# Patient Record
Sex: MALE | Race: AMERICAN INDIAN OR ALASKA NATIVE | Employment: FULL TIME | URBAN - METROPOLITAN AREA
[De-identification: names, ages, dates, MRNs, and addresses within clinical notes are randomized per-mention and may not be internally consistent; named-entity substitution may affect disease eponyms.]

---

## 2018-10-05 ENCOUNTER — HOSPITAL ENCOUNTER (OUTPATIENT)
Dept: CT IMAGING | Age: 23
Discharge: HOME | End: 2018-10-05
Attending: UROLOGY
Payer: COMMERCIAL

## 2018-10-05 DIAGNOSIS — R10.31 INGUINAL PAIN, RIGHT: ICD-10-CM

## 2018-10-05 PROCEDURE — 74176 CT ABD & PELVIS W/O CONTRAST: CPT

## 2018-10-10 NOTE — PROGRESS NOTES
Please notify pt that CT scan showed no concerning findings -- no inguinal hernia and no findings to explain his right sided pain. May be related to muscle strain or nerve irritation. He can f/u PRN (and then f/u w/ PCP for further evaluation of his pain) or he can keep his 10/25/18 appt w/ me if he wishes   Thanks.

## 2019-05-01 ENCOUNTER — HOSPITAL ENCOUNTER (OUTPATIENT)
Dept: PHYSICAL THERAPY | Age: 24
Discharge: HOME | End: 2019-05-01
Payer: COMMERCIAL

## 2019-05-01 PROCEDURE — 97112 NEUROMUSCULAR REEDUCATION: CPT

## 2019-05-01 PROCEDURE — 97162 PT EVAL MOD COMPLEX 30 MIN: CPT

## 2019-05-01 PROCEDURE — 97530 THERAPEUTIC ACTIVITIES: CPT

## 2019-05-01 NOTE — PROGRESS NOTES
PT DAILY TREATMENT NOTE/LUMBAR EVAL 10-18    Patient Name: Marvel Patch  Date:2019  : 1995  [x]  Patient  Verified  Payor: Briana Estimable / Plan: [facility] HMO / Product Type: HMO /    In time:337  Out time:425  Total Treatment Time (min): 48  Visit #: 1 of 12    Medicare/BCBS Only   Total Timed Codes (min):  28 1:1 Treatment Time:  28     Treatment Area: Back pain [M54.9]  SUBJECTIVE  Pain Level (0-10 scale): 6-7/10  [x]constant []intermittent []improving []worsening []no change since onset  10/10 worst pain    Any medication changes, allergies to medications, adverse drug reactions, diagnosis change, or new procedure performed?: [x] No    [] Yes (see summary sheet for update)  Subjective functional status/changes:      Back pain since high school, fell off high deck   Most recent flare up began 6 months ago, felt intense back pain when lifting at work    Works on scrap yard , 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, on leave till    Hurts standing and walking, begins in left LE and then travels down R LE  Had xray 2 days ago does not have results   Lives with fiance  25-30 minutes without pain standing tolerance   Pt Goals: \" eliminate pain and gain strength and worry less about re-injury \"  Goes to the gym, but recently stopped due to back pain      OBJECTIVE/EXAMINATION      20 min [x]Eval                  []Re-Eval       12 min Therapeutic Activity:  []  See flow sheet : educated pt on findings, repeated movement testing with flexion bias using spinal model, core anatomy, use of tennis ball for self tissue release of lumbar paraspinals, use of modalities moist heat and stretching   Rationale: increase ROM and increase strength  to improve the patients ability to complete ADL's and work tasks with ease.       16 min Neuromuscular Re-education:  []  See flow sheet : diaphragmatic breathing, TA iso, TA marching, TA marching with GTB @ B UE, TA SLR, DKTC on SB, LTR on SB   Rationale: increase ROM, increase strength, improve coordination and increase proprioception  to improve the patients ability to activate TA to improve core stability and reduce low back pain with ADL's. With   [] TE   [] TA   [] neuro   [] other: Patient Education: [x] Review HEP    [] Progressed/Changed HEP based on:   [] positioning   [] body mechanics   [] transfers   [] heat/ice application    [] other:      Other Objective/Functional Measures:     Physical Therapy Evaluation - Lumbar Spine ([facility])    SUBJECTIVE  Chief Complaint: low back pain with intermittent radicular symptoms in B LE's    Mechanism of injury: chronic back pain exacerbated by heavy lifting     Symptoms:  Aggravated by:   [] Bending [] Sitting [x] Standing [x] Walking   [] Moving [] Cough [] Sneeze [] Valsalva   [] AM  [] PM  Lying:  [] sup   [] pro   [] sidelying   [] Other:     Eased by:    [] Bending [x] Sitting [] Standing [] Walking   [] Moving [] AM  [] PM  Lying: [x] sup  [] pro  [] sidelying   [] Other:     General Health:  Red Flags Indicated? [] Yes    [x] No  [] Yes [] No Recent weight change (If yes, due to dieting?  [] Yes  [] No)   [] Yes [] No Weakness in legs during walking  [] Yes [] No Unremitting pain at night  [] Yes [] No Abdominal pain or problems  [] Yes [] No Rectal bleeding  [] Yes [] No Feet more cold or painful in cold weather  [] Yes [] No Menstrual irregularities  [] Yes [] No Blood or pain with urination  [] Yes [] No Dysfunction of bowel or bladder  [] Yes [] No Recent illness within past 3 weeks (i.e, cold, flu)  [] Yes [] No Numbness/tingling in buttock/genitalia region    Past History/Treatments:     Diagnostic Tests: [] Lab work [] X-rays    [] CT [] MRI     [] Other:  Results:     Functional Status  Prior level of function:  Present functional limitations:  What position do you sleep in?:    OBJECTIVE  Posture:  Lateral Shift: [] R    [] L     [] +  [] -  Kyphosis: [] Increased [] Decreased   [] WNL  Lordosis:  [] Increased [] Decreased   [] WNL  Pelvic symmetry: [] WNL    [] Other:    Gait:  [x] Normal     [] Abnormal:    Active Movements: [] N/A   [] Too acute   [] Other: 23 cm from ground  ROM % AROM % PROM Comments:pain, area   Forward flexion 40-60 75%  pain   Extension 20-30 25%  pain   SB right 20-30 wnl     SB left 20-30 wnl     Rotation right 5-10 wnl     Rotation left 5-10 wnl       Repeated Movements   Effects on present pain: produces (MD), abolishes (A), increases (incr), decreases (decr), centralizes (C), peripheral (PH), no effect (NE)   Pre-Test Sx Flexion Repeated Flexion Extension Repeated Extension Repeated SBL Repeated SBR   Sitting   Decr, C       Standing    Incr, PH      Lying      N/A N/A   Comments:  Side Glide:  Sustained passive positioning test:    Neuro Screen [x] WNL  Myotome/Dermatome/Reflexes:  Comments:    Dural Mobility:  SLR Sitting: [] R    [] L    [] +    [] -  @ (degrees):           Supine: [] R    [] L    [] +    [] -  @ (degrees):   Slump Test: [x] R    [x] L    [x] +    [] -  @ (degrees):   Prone Knee Bend: [] R    [] L    [] +    [] -     Palpation   [] Min  [x] Mod  [x] Severe    Location: lumbar paraspinals, L2-L5 spinous processes  [] Min  [] Mod  [] Severe    Location:  [] Min  [] Mod  [] Severe    Location:    Strength   L(0-5) R (0-5) N/T   Hip Flexion (L1,2) 5/5 5/5 []   Knee Extension (L3,4) 5/5 5/5 []   Ankle Dorsiflexion (L4) 5/5 5/5 []   Great Toe Extension (L5) 5/5 5/5 []   Ankle Plantarflexion (S1)   []   Knee Flexion (S1,2)   []   Upper Abdominals   []   Lower Abdominals   []   Paraspinals   []   Back Rotators   []   Gluteus Ton   []   Other   []     Special Tests  Lumbar:  Lumb.  Compression: [] Pos  [] Neg               Lumbar Distraction:   [] Pos  [] Neg    Quadrant:  [] Pos  [] Neg   [] Flex  [] Ext    Sacroilliac:  Bobbi's: [] R    [] L    [] +    [] -     Compression: [] +    [] -     Gapping:  [] +    [] -     Thigh Thrust: [] R    [] L [] +    [] -     Leg Length: [] +    [] -   Position:    Crests:    ASIS:    PSIS:    Sacral Sulcus:    Mobility: Standing flex:     Sitting flex:     Supine to sit:     Prone knee bend:         Hip: Yessenia Farrah:  [] R    [] L    [] +    [] -     Scour:  [] R    [] L    [] +    [] -     Piriformis: [] R    [] L    [] +    [] -          Deficits: Arpit's: [] R    [] L    [] +    [] -     [place]: [] R    [] L    [] +    [] -     Hamstrings 90/90:    Gastrocsoleus (to neutral): Right: Left:       Global Muscular Weakness:  Abdominals:  Quadratus Lumborum:  Paraspinals: Other:    Other tests/comments:  Tight B hamstrings         Pain Level (0-10 scale) post treatment: 3/10    ASSESSMENT/Changes in Function: see POC. Patient will continue to benefit from skilled PT services to modify and progress therapeutic interventions, address functional mobility deficits, address ROM deficits, address strength deficits, analyze and address soft tissue restrictions, analyze and cue movement patterns, analyze and modify body mechanics/ergonomics and instruct in home and community integration to attain remaining goals.      [x]  See Plan of Care  []  See progress note/recertification  []  See Discharge Summary         Progress towards goals / Updated goals:  See POC    PLAN  []  Upgrade activities as tolerated     [x]  Continue plan of care  []  Update interventions per flow sheet       []  Discharge due to:_  []  Other:_      Breanne South, PT 5/1/2019  3:33 PM

## 2019-05-01 NOTE — PROGRESS NOTES
[facility]  [address]  [phone] fax    Plan of Care/ Statement of Necessity for Physical Therapy Services    Patient name: Phoenix Matos Start of Care: 2019   Referral source: Elliott Cobos DO : 1995    Medical Diagnosis: Back pain [M54.9]  Payor: Gerardo Dyer / Plan: [address] HMO / Product Type: HMO /  Onset Date:6 months ago    Treatment Diagnosis: low back pain   Prior Hospitalization: see medical history Provider#: [id number]   Medications: Verified on Patient summary List    Comorbidities: back pain   Prior Level of Function: goes to the gym, works at [address] [employer] yard as  ( 75# lifting)     The Plan of Care and following information is based on the information from the initial evaluation. Assessment/ key information: Patient is a 22year-old male with a history of chronic back pain since high school, who presents chief complaint of exacerbation of back pain that began 6 months ago after heavy lifting at work. Patient is currently on leave from work until  and has stopped going to the gym due to symptoms. Patient reports intermittent radicular pain down B LE's. He has limited standing and ambulatory tolerance due to increase pain; can stand for about 25 minutes comfortably. Patients l/s AROM is WNL, except flexion limited to 75% and extension 25% with pain. Pt presents with flexion bias; his radicular symptoms increase following repeated extension in standing and decrease/centralize following repeated flexion in sitting. He is TTP along lumbar paraspinals and L2-L5 spinous processes. Myotomes/dermatomes are within normal limits. Patient will benefit from skilled PT to improve core/hip strength and flexibility to increase standing/ambulatory tolerance and resume full duties at work.      Evaluation Complexity History MEDIUM  Complexity : 1-2 comorbidities / personal factors will impact the outcome/ POC ; Examination MEDIUM Complexity : 3 Standardized tests and measures addressing body structure, function, activity limitation and / or participation in recreation  ;Presentation MEDIUM Complexity : Evolving with changing characteristics  ; Clinical Decision Making MEDIUM Complexity : FOTO score of 26-74  Overall Complexity Rating: MEDIUM  Problem List: pain affecting function, decrease ROM, decrease strength, decrease ADL/ functional abilitiies, decrease activity tolerance and decrease flexibility/ joint mobility   Treatment Plan may include any combination of the following: Therapeutic exercise, Therapeutic activities, Neuromuscular re-education, Physical agent/modality, Manual therapy, Patient education and Functional mobility training  Patient / Family readiness to learn indicated by: trying to perform skills and interest  Persons(s) to be included in education: patient (P)  Barriers to Learning/Limitations: None  Patient Goal (s): eliminate pain and gain strength   Patient Self Reported Health Status: fair  Rehabilitation Potential: good    Short Term Goals: To be accomplished in 1 weeks:  1. Patient will be compliant with HEP to improve therapy outcomes. Long Term Goals: To be accomplished in 6 weeks:  1. Patient will increase FOTO score by 23 pts to show improved functional mobility and QOL. 2. Patient will report 60% improvement in symptoms to restore PLOF. 3. Patient will report <2-3/10 pain to improve ease of work tasks and safely resume workout regime. 4. Patient will maintain plank for 30 seconds with good form to improve core strength and increase standing/ambulatory tolerance. Frequency / Duration: Patient to be seen 2 times per week for 6 weeks.     Patient/ CarPatient/ Caregiver education and instruction: Diagnosis, prognosis, exercises   [x]  Plan of care has been reviewed with SHY Chacon, PT 5/1/2019 6:54 PM    ________________________________________________________________________    I certify that the above Therapy Services are being furnished while the patient is under my care. I agree with the treatment plan and certify that this therapy is necessary.     Physician's Signature:____________Date:_________TIME:________    ** Signature, Date and Time must be completed for valid certification **    Please sign and return to [facility] [employer] [facility] [employer]  [address] [place] [address]  [phone] fax

## 2019-05-02 ENCOUNTER — HOSPITAL ENCOUNTER (OUTPATIENT)
Dept: PHYSICAL THERAPY | Age: 24
Discharge: HOME | End: 2019-05-02
Payer: COMMERCIAL

## 2019-05-02 PROCEDURE — 97110 THERAPEUTIC EXERCISES: CPT

## 2019-05-02 NOTE — PROGRESS NOTES
PT DAILY TREATMENT NOTE 10-18    Patient Name: Tonio Silva  Date:2019  : 1995  [x]  Patient  Verified  Payor: Becki Portillo / Plan: [employer] HMO / Product Type: HMO /    In time: 12:30  Out time:1:10  Total Treatment Time (min): 40  Visit #: 2 of 12    Treatment Area: Back pain [M54.9]    SUBJECTIVE  Pain Level (0-10 scale): 3/10  Any medication changes, allergies to medications, adverse drug reactions, diagnosis change, or new procedure performed?: [x] No    [] Yes (see summary sheet for update)  Subjective functional status/changes:   [] No changes reported  Pt reports not too much pain today. He isn't feeling anything down his legs currently. He wants to get back to the gym to lose weight to look better in his clothes. He is getting  next October. He is going to try the bike so it is less impact on his back. He has pain with prolonged standing and walking. 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 OBJECTIVE    40 min Therapeutic Exercise:  [x] See flow sheet :   Rationale: increase ROM and increase strength to improve the patients ability to perform work duties, walk and work on cars without back pain          With   [] TE   [] TA   [] neuro   [] other: Patient Education: [x] Review HEP    [] Progressed/Changed HEP based on:   [] positioning   [] body mechanics   [] transfers   [] heat/ice application    [] other:      Other Objective/Functional Measures:   Plank 49 seconds before hip flexion compensation  Initial discomfort with OOV: performed a few marches without UEs on table but needed for remaining reps  Challenged with side planks modified form  Severe HS tightness bilaterally  Slight increase in pain towards end of session with bridges on SB and butt burners; expect fatigue of core by end     Pain Level (0-10 scale) post treatment: 4.5/10    ASSESSMENT/Changes in Function: Pt with good motivation to progress in therapy. He has not yet begun his HEP due to evaluation yesterday. He has good starting core strength but needs functional mobility with core engagement for work and activities without increased pain. Will also work on hip strengthening for stability as well. Short Term Goals: To be accomplished in 1 weeks:  1. Patient will be compliant with HEP to improve therapy outcomes. Long Term Goals: To be accomplished in 6 weeks:  1. Patient will increase FOTO score by 23 pts to show improved functional mobility and QOL. 2. Patient will report 60% improvement in symptoms to restore PLOF. 3. Patient will report <2-3/10 pain to improve ease of work tasks and safely resume workout regime. 4. Patient will maintain plank for 30 seconds with good form to improve core strength and increase standing/ambulatory tolerance.      PLAN  [x]  Upgrade activities as tolerated     [x]  Continue plan of care  []  Update interventions per flow sheet       []  Discharge due to:_  []  Other:_      Ernestine Plascencia, PTA 5/2/2019  1:48 PM    Future Appointments   Date Time Provider Supa Hughes   5/6/2019  8:30 AM Marah Eastman, PT [facility]   5/8/2019  8:30 AM Monster Solitario PTA [facility]   5/14/2019  8:30 AM [facility] Le [facility]   5/16/2019 10:30 AM Clarice Gomez, PT [facility]   5/20/2019  9:00 AM Carri Screen, PTA [facility]   5/22/2019  8:30 AM Carri Screen, PTA [facility]   5/24/2019  2:45 PM Douglas Abarca  [address]   5/29/2019  9:30 AM Carri Screen, PTA [facility]   5/31/2019  9:30 AM Carri Screen, PTA [facility]

## 2019-05-06 ENCOUNTER — HOSPITAL ENCOUNTER (OUTPATIENT)
Dept: PHYSICAL THERAPY | Age: 24
Discharge: HOME | End: 2019-05-06
Payer: COMMERCIAL

## 2019-05-06 PROCEDURE — 97110 THERAPEUTIC EXERCISES: CPT

## 2019-05-06 NOTE — PROGRESS NOTES
PT DAILY TREATMENT NOTE 10-18    Patient Name: Martha Harris  Date:2019  : 1995  [x]  Patient  Verified  Payor: Pedrito Escobar / Plan: [employer] HMO / Product Type: HMO /    In time:833  Out time:900  Total Treatment Time (min): 27  Visit #: 3 of 12    Treatment Area: Back pain [M54.9]    SUBJECTIVE  Pain Level (0-10 scale): 6/10  Any medication changes, allergies to medications, adverse drug reactions, diagnosis change, or new procedure performed?: [x] No    [] Yes (see summary sheet for update)  Subjective functional status/changes:   [] No changes reported  Pt stated that he has increased pain today    OBJECTIVE    27 min Therapeutic Exercise:  [x] See flow sheet :   Rationale: increase ROM and increase strength to improve the patients ability to increase ease with ADLs    With   [x] TE   [] TA   [] neuro   [] other: Patient Education: [x] Review HEP    [] Progressed/Changed HEP based on:   [] positioning   [] body mechanics   [] transfers   [] heat/ice application    [] other:      Other Objective/Functional Measures:   Had no difficulty with exercises  Did need cueing with TA exercises not to hold breath  Needed cueing with butt burners  Reported that he has groin pain when the back pain becomes intense     Pain Level (0-10 scale) post treatment: 7/10    ASSESSMENT/Changes in Function:   Pt is slowly progressing toward goals. Pt cont with moderate low back pain. Pt cont with decreased core strength. Patient will continue to benefit from skilled PT services to modify and progress therapeutic interventions, address functional mobility deficits, address ROM deficits and address strength deficits to attain remaining goals. [x]  See Plan of Care  []  See progress note/recertification  []  See Discharge Summary         Progress towards goals / Updated goals:  Short Term Goals: To be accomplished in 1 weeks:  1.  Patient will be compliant with HEP to improve therapy outcomes.   Long Term Goals: To be accomplished in 6 weeks:  1. Patient will increase FOTO score by 23 pts to show improved functional mobility and QOL. 2. Patient will report 60% improvement in symptoms to restore PLOF. 3. Patient will report <2-3/10 pain to improve ease of work tasks and safely resume workout regime. Not met. Pt had 6/10 pain today. 5/6/19  4.  Patient will maintain plank for 30 seconds with good form to improve core strength and increase standing/ambulatory tolerance.     PLAN  []  Upgrade activities as tolerated     [x]  Continue plan of care  []  Update interventions per flow sheet       []  Discharge due to:_  []  Other:_      Reba Hill PTA 5/6/2019  8:34 AM    Future Appointments   Date Time Provider Supa Hughes   5/8/2019  8:30 AM Liest Blackburn PTA [facility]   5/14/2019  8:30 AM Narcisa Side, PT [id number]   5/16/2019 10:30 AM Narcisa Side, PT [id number]   5/20/2019  9:00 AM [place] Knight, PTA [facility]   5/22/2019  8:30 AM [place] Laity, PTA [facility]   5/24/2019  2:45 PM Rylee Mccarthy  [address]    5/29/2019  9:30 AM [place] Joaquin[place], PTA [facility]   5/31/2019  9:30 AM [place] Laity, PTA [facility]

## 2019-05-08 ENCOUNTER — APPOINTMENT (OUTPATIENT)
Dept: PHYSICAL THERAPY | Age: 24
End: 2019-05-08
Payer: COMMERCIAL

## 2019-05-14 ENCOUNTER — HOSPITAL ENCOUNTER (OUTPATIENT)
Dept: PHYSICAL THERAPY | Age: 24
Discharge: HOME | End: 2019-05-14
Payer: COMMERCIAL

## 2019-05-14 PROCEDURE — 97110 THERAPEUTIC EXERCISES: CPT

## 2019-05-14 NOTE — PROGRESS NOTES
PT DAILY TREATMENT NOTE 10-18    Patient Name: Pippa Morgan  Date:2019  : 1995  [x]  Patient  Verified  Payor: Julio Torres / Plan: [employer] / Product Type: HMO /    In time:835  Out time:915  Total Treatment Time (min): 40  Visit #: 4 of 12    Treatment Area: Back pain [M54.9]    SUBJECTIVE  Pain Level (0-10 scale): 6/10   Any medication changes, allergies to medications, adverse drug reactions, diagnosis change, or new procedure performed?: [x] No    [] Yes (see summary sheet for update)  Subjective functional status/changes:   [] No changes reported  Pt stated that he has increased pain today, radicular symptoms down B LE's. OBJECTIVE    40 min Therapeutic Exercise:  [x] See flow sheet :   Rationale: increase ROM and increase strength to improve the patients ability to increase ease with ADLs    With   [x] TE   [] TA   [] neuro   [] other: Patient Education: [x] Review HEP    [] Progressed/Changed HEP based on:   [] positioning   [] body mechanics   [] transfers   [] heat/ice application    [] other:      Other Objective/Functional Measures:   Radicular pain into B feet lying in supine  No radicular symptoms prone  Centralization after prone propped, right LE symptoms to buttock and left thigh   Fatigue with forward planks at 30 seconds  Had no difficulty with exercises  Performs TRX squat without pain  Added felipa pushout 8.5#  Updated HEP for extension bias     Pain Level (0-10 scale) post treatment: 6/10 LBP, left LE    ASSESSMENT/Changes in Function: Patient presents with centralization of symptoms right> left LE following prone propping. Educated pt on extension bias and updated HEP. He continues to present with decreased core strength and fatigues with 30 second forward planks. Will continue to assess directional preference and increase core strength to reduce radicular symptoms and improve ease of mobility for return to work.      Patient will continue to benefit from skilled PT services to modify and progress therapeutic interventions, address functional mobility deficits, address ROM deficits and address strength deficits to attain remaining goals. [x]  See Plan of Care  []  See progress note/recertification  []  See Discharge Summary         Progress towards goals / Updated goals:  Short Term Goals: To be accomplished in 1 weeks:  1. Patient will be compliant with HEP to improve therapy outcomes.   Long Term Goals: To be accomplished in 6 weeks:  1. Patient will increase FOTO score by 23 pts to show improved functional mobility and QOL. 2. Patient will report 60% improvement in symptoms to restore PLOF.  40%, has stronger core but still has a lot of back pain, can stand longer, can go shopping and does not have to sit right away 5/14  3. Patient will report <2-3/10 pain to improve ease of work tasks and safely resume workout regime. Not met. Pt had 6/10 pain today. 5/6/19  4.  Patient will maintain plank for 30 seconds with good form to improve core strength and increase standing/ambulatory tolerance.     PLAN  []  Upgrade activities as tolerated     [x]  Continue plan of care  []  Update interventions per flow sheet       []  Discharge due to:_  []  Other:_      Syed Hernandez, PT 5/14/2019  8:34 AM    Future Appointments   Date Time Provider Supa Hughes   5/14/2019  8:30 AM Guzman Freeman, PT [facility]   5/16/2019 10:30 AM Guzman Freeman PT [facility]   5/20/2019  9:00 AM Andris Koyanagi, PTA [facility]   5/22/2019  8:30 AM Andris Koyanagi, PTA [facility]   5/24/2019  2:45 PM Gume Yang  [address]   5/29/2019  9:30 AM Andris Koyanagi, PTA [facility]   5/31/2019  9:30 AM Andris Koyanagi, PTA [facility]

## 2019-05-16 ENCOUNTER — HOSPITAL ENCOUNTER (OUTPATIENT)
Dept: PHYSICAL THERAPY | Age: 24
End: 2019-05-16
Payer: COMMERCIAL

## 2019-05-20 ENCOUNTER — HOSPITAL ENCOUNTER (OUTPATIENT)
Dept: PHYSICAL THERAPY | Age: 24
Discharge: HOME | End: 2019-05-20
Payer: COMMERCIAL

## 2019-05-20 PROCEDURE — 97530 THERAPEUTIC ACTIVITIES: CPT

## 2019-05-20 PROCEDURE — 97110 THERAPEUTIC EXERCISES: CPT

## 2019-05-20 NOTE — PROGRESS NOTES
PT DAILY TREATMENT NOTE 10-18    Patient Name: Travis Nashville  Date:2019  : 1995  [x]  Patient  Verified  Payor: Meño Rosado / Plan: [employer] / Product Type: HMO /    In time: 9:04  Out time: 9:35  Total Treatment Time (min): 31  Visit #: 5 of 12    Treatment Area: Back pain [M54.9]    SUBJECTIVE  Pain Level (0-10 scale): 5/10 LBP, no radicular sx   Any medication changes, allergies to medications, adverse drug reactions, diagnosis change, or new procedure performed?: [x] No    [] Yes (see summary sheet for update)  Subjective functional status/changes:   [] No changes reported  I have not noticed much difference yet. The main thing that helps is stretching. OBJECTIVE    23 min Therapeutic Exercise:  [x] See flow sheet :   Rationale: increase ROM and increase strength to improve the patients ability to increase ease with ADLs    8 min Therapeutic Activity: Added lifting education for improved functional ADL's. 15# crate from floor to waist x 10 repetitions with 50% verbal cuing for form and technique for safety   Rationale: increased safety with lifting for functional ADL's    With   [x] TE   [] TA   [] neuro   [] other: Patient Education: [x] Review HEP    [] Progressed/Changed HEP based on:   [] positioning   [] body mechanics   [] transfers   [] heat/ice application    [] other:      Other Objective/Functional Measures:   - Added prone press-up  - Added SB #2  - Educated on Lifting Mechanics with 15# crate   - Initial tactile cuing for form with PPT  - Initiated wall PPT    Pain Level (0-10 scale) post treatment: 5/10    ASSESSMENT/Changes in Function:   Patient presents with LBP but no radicular sx today. He tolerated light progression of TE well with no increase in pain or sx. He was educated on proper lifting mechanics, requiring 50% cuing.      Patient will continue to benefit from skilled PT services to modify and progress therapeutic interventions, address functional mobility deficits, address ROM deficits and address strength deficits to attain remaining goals. [x]  See Plan of Care  []  See progress note/recertification  []  See Discharge Summary         Progress towards goals / Updated goals:  Short Term Goals: To be accomplished in 1 weeks:  1. Patient will be compliant with HEP to improve therapy outcomes.   Long Term Goals: To be accomplished in 6 weeks:  1. Patient will increase FOTO score by 23 pts to show improved functional mobility and QOL. 2. Patient will report 60% improvement in symptoms to restore PLOF.  40%, has stronger core but still has a lot of back pain, can stand longer, can go shopping and does not have to sit right away 5/14  3. Patient will report <2-3/10 pain to improve ease of work tasks and safely resume workout regime. Not met. Pt had 6/10 pain today. 5/6/19  4. Patient will maintain plank for 30 seconds with good form to improve core strength and increase standing/ambulatory tolerance.   Current: Progressing, pt continues to have difficulty with 30s holds.  5/20/19    PLAN  []  Upgrade activities as tolerated     [x]  Continue plan of care  []  Update interventions per flow sheet       []  Discharge due to:_  []  Other:_      DUSTY Hdz 5/20/2019  9:35 AM    Future Appointments   Date Time Provider Supa Hughes   5/22/2019  8:30 AM Stephanie Goldman PTA [facility]   5/29/2019  9:30 AM Stephanie Goldman PTA [facility]   5/31/2019  9:30 AM Stephanie Goldman PTA [facility]

## 2019-05-22 ENCOUNTER — HOSPITAL ENCOUNTER (OUTPATIENT)
Dept: PHYSICAL THERAPY | Age: 24
Discharge: HOME | End: 2019-05-22
Payer: COMMERCIAL

## 2019-05-22 PROCEDURE — 97140 MANUAL THERAPY 1/> REGIONS: CPT

## 2019-05-22 PROCEDURE — 97110 THERAPEUTIC EXERCISES: CPT

## 2019-05-22 NOTE — PROGRESS NOTES
PT DAILY TREATMENT NOTE 10-18    Patient Name: Zeb Avila  Date:2019  : 1995  [x]  Patient  Verified  Payor: Khadar Rhodes / Plan: [employer] / Product Type: HMO /    In time:8:30   Out time: 9:15  Total Treatment Time (min): 45  Visit #: 6 of 12    Treatment Area: Back pain [M54.9]    SUBJECTIVE  Pain Level (0-10 scale): 7-8/10  Any medication changes, allergies to medications, adverse drug reactions, diagnosis change, or new procedure performed?: [x] No    [] Yes (see summary sheet for update)  Subjective functional status/changes:   [] No changes reported  Pt reports burning down the back of his left thigh to his knee. He states the other day he could hardly move. He hasn't seen improvement in his pain. He states if therapy doesn't help they will do injections or a MRI. In 9th grade he broke his ankle at the growth plate and has had a shorter left leg ever since.      OBJECTIVE    35 min Therapeutic Exercise:  [x] See flow sheet :   Rationale: increase ROM and increase strength to improve the patients ability to increase ease with ADLs    10 min Manual therapy; MET for left upslip; MET for right AI; shotgun technique; MET for left/left sacral torsion; MET for left l/s rotation   Rationale: increased safety with lifting for functional ADL's    With   [x] TE   [] TA   [] neuro   [] other: Patient Education: [x] Review HEP    [] Progressed/Changed HEP based on:   [] positioning   [] body mechanics   [] transfers   [] heat/ice application    [] other:      Other Objective/Functional Measures:   Hypertonic left quadratus lumborum; educated on stretch off stair, heat and tennis ball massage to address  Trial of 1/8 inch heel lift to address LLD on the left  Tight right piriformis   Added hip IR/ER unilateral to work on muscle imbalance  Slump sitting  Extension increased symptoms  Able to centralize to low back with decreased pain to 5/10 with manual      Pain Level (0-10 scale) post treatment: 5/10    ASSESSMENT/Changes in Function: Pt was making slow progress towards goals with continued left>right radicular symptoms. He had a growth plate fracture in the left ankle not previously reported causing a LLD. He has a pelvic obliquity and hypertonic left quadratus lumborum likely contributing to radicular symptoms from muscle imbalance. Will work to improve alignment and further strengthen hips/core for decreased pain with ambulation and standing and for return to a gym routine for weight loss. Progress towards goals / Updated goals:  Short Term Goals: To be accomplished in 1 weeks:  1. Patient will be compliant with HEP to improve therapy outcomes.   Long Term Goals: To be accomplished in 6 weeks:  1. Patient will increase FOTO score by 23 pts to show improved functional mobility and QOL. 2. Patient will report 60% improvement in symptoms to restore PLOF.  40%, has stronger core but still has a lot of back pain, can stand longer, can go shopping and does not have to sit right away 5/14  3. Patient will report <2-3/10 pain to improve ease of work tasks and safely resume workout regime. Not met. Pt had 6/10 pain today. 5/6/19  4. Patient will maintain plank for 30 seconds with good form to improve core strength and increase standing/ambulatory tolerance.   Current: Progressing, pt continues to have difficulty with 30s holds.  5/20/19    PLAN  [x]  Upgrade activities as tolerated     [x]  Continue plan of care  []  Update interventions per flow sheet       []  Discharge due to:_  []  Other:_      Lolis Ards, PTA, LPTA 5/22/2019  9:35 AM    Future Appointments   Date Time Provider Supa Hughes   5/22/2019  8:30 AM Ethel Chen PTA [facility]   5/29/2019  9:30 AM Ethel Chen PTA [facility]   5/31/2019  9:30 AM SHY Cazares [facility]

## 2019-05-29 ENCOUNTER — HOSPITAL ENCOUNTER (OUTPATIENT)
Dept: PHYSICAL THERAPY | Age: 24
Discharge: HOME | End: 2019-05-29
Payer: COMMERCIAL

## 2019-05-29 PROCEDURE — 97110 THERAPEUTIC EXERCISES: CPT

## 2019-05-29 NOTE — PROGRESS NOTES
PT DAILY TREATMENT NOTE 10-18    Patient Name: Makayla Loredo  Date:2019  : 1995  [x]  Patient  Verified  Payor: Mercy Schilling / Plan: [employer] / Product Type: HMO /    In time:9:32   Out time: 10:05  Total Treatment Time (min): 33   Visit #: 7 of 12    Treatment Area: Back pain [M54.9]    SUBJECTIVE  Pain Level (0-10 scale): 5/10  Any medication changes, allergies to medications, adverse drug reactions, diagnosis change, or new procedure performed?: [x] No    [] Yes (see summary sheet for update)  Subjective functional status/changes:   [] No changes reported  Pt reports no symptoms down his left leg currently but moderate central low back pain. He thinks the heel lift may be helping some. He had an episode of sharp pain higher up when picking up a laundry basket but dissipated afterwards and hasn't been there since. OBJECTIVE    30 min Therapeutic Exercise:  [x] See flow sheet :   Rationale: increase ROM and increase strength to improve the patients ability to increase ease with ADLs    3 min Manual therapy; MET for left upslip; MET for right AI; shotgun technique; MET for left/left sacral torsion; MET for left l/s rotation   Rationale: increased safety with lifting for functional ADL's    With   [x] TE   [] TA   [] neuro   [] other: Patient Education: [x] Review HEP    [] Progressed/Changed HEP based on:   [] positioning   [] body mechanics   [] transfers   [] heat/ice application    [] other:      Other Objective/Functional Measures:   HEP compliance:met  Educated on self pelvic correction  Increased heel lift heigh to 1/4 inch for left LLD  Educated on hip IR/ER to work on (right-IR left-ER)  Continues with decreased core endurance at 30 seconds with planks  Continues to be flexion bias    Pain Level (0-10 scale) post treatment: 6/10    ASSESSMENT/Changes in Function: Pt is making slow progress towards goals.  He continues to have pelvic obliquity from LLD of left shorter than right. He has decreased hip and core strength causing instability with household chores like lifting laundry baskets. He has decrease in radicular symptoms and continues to respond to flexion bias. Will continue working to stabilize the l/s to reduce pain and ease of working out for physical fitness. Progress towards goals / Updated goals:  Short Term Goals: To be accomplished in 1 weeks:  1. Patient will be compliant with HEP to improve therapy outcomes.   Long Term Goals: To be accomplished in 6 weeks:  1. Patient will increase FOTO score by 23 pts to show improved functional mobility and QOL. 2. Patient will report 60% improvement in symptoms to restore PLOF.  40%, has stronger core but still has a lot of back pain, can stand longer, can go shopping and does not have to sit right away 5/14  3. Patient will report <2-3/10 pain to improve ease of work tasks and safely resume workout regime. Not met. Pt had 6/10 pain today. 5/6/19  4. Patient will maintain plank for 30 seconds with good form to improve core strength and increase standing/ambulatory tolerance.   Current: Progressing, pt continues to have difficulty with 30s holds.  5/20/19    PLAN  [x]  Upgrade activities as tolerated     [x]  Continue plan of care  []  Update interventions per flow sheet       []  Discharge due to:_  []  Other:_      Luzma Hope PTA, LPTA 5/29/2019  9:35 AM    Future Appointments   Date Time Provider Supa Hughes   5/29/2019  9:30 AM Sandy Stallworth PTA [facility]   5/31/2019  9:30 AM SHY BrionesPTKAJAL [facility]

## 2019-05-31 ENCOUNTER — APPOINTMENT (OUTPATIENT)
Dept: PHYSICAL THERAPY | Age: 24
End: 2019-05-31
Payer: COMMERCIAL

## 2019-08-21 PROBLEM — M48.00 SPINAL STENOSIS: Status: ACTIVE | Noted: 2019-08-21

## 2019-10-10 NOTE — PROGRESS NOTES
[facility] [employer]  [address] [place] [address]  [phone] fax    Physical Therapy Discharge Summary    Patient name: Phoenix Matos Start of Care: 2019   Referral source: Elliott Cobos DO : 1995                Medical Diagnosis: Back pain [M54.9]  Payor: Gerardo Dyer / Plan: [employer] / Product Type: HMO /  Onset Date:6 months ago                Treatment Diagnosis: low back pain   Prior Hospitalization: see medical history Provider#: [id number]   Medications: Verified on Patient summary List    Comorbidities: back pain   Prior Level of Function: goes to the gym, works at [employer]  ( 75# lifting)       Visits from [place] of Care: 7    Missed Visits: 3    Reporting Period : 2019 to 2019    Summary of Care:  Goal:Patient will increase FOTO score by 23 pts to show improved functional mobility and QOL. Status at last note/certification:42/100  Status at discharge: not met    Goal:Patient will report 60% improvement in symptoms to restore PLOF. Status at last note/certification: 95%  Status at discharge: not met    Goal:Patient will report <2-3/10 pain to improve ease of work tasks and safely resume workout regime. Status at last note/certification:   Status at discharge: not met    Goal:Patient will maintain plank for 30 seconds with good form to improve core strength and increase standing/ambulatory tolerance.   Status at last note/certification: progressing  Status at discharge: not met      ASSESSMENT/RECOMMENDATIONS: Patient was making slow, steady progress towards goals. He continues to have moderate low back pain with household chores like lifting laundry basket. Pt working on improving core and hip strength. He demonstrates flexion bias with decreased radicular symptoms.  Patient canceled his last scheduled PT appointment, no reason given, and has not contact therapy office for further scheduling. It has been > 30 days, therefore patient will be discharged from PT.  Will need new script if pt would like to return to PT.     [x]Discontinue therapy: []Patient has reached or is progressing toward set goals      [x]Patient is non-compliant or has abdicated      []Due to lack of appreciable progress towards set goals    Nikolas Apple, PT 10/10/2019 12:45 PM

## 2022-03-19 PROBLEM — M48.00 SPINAL STENOSIS: Status: ACTIVE | Noted: 2019-08-21

## 2023-02-20 ENCOUNTER — HOSPITAL ENCOUNTER (OUTPATIENT)
Facility: HOSPITAL | Age: 28
Discharge: HOME | End: 2023-02-23

## 2023-02-20 LAB — SENTARA SPECIMEN COLLECTION: NORMAL

## 2023-02-20 PROCEDURE — 99001 SPECIMEN HANDLING PT-LAB: CPT

## 2025-01-02 ENCOUNTER — HOSPITAL ENCOUNTER (EMERGENCY)
Facility: HOSPITAL | Age: 30
Discharge: HOME | End: 2025-01-02
Payer: COMMERCIAL

## 2025-01-02 ENCOUNTER — APPOINTMENT (OUTPATIENT)
Facility: HOSPITAL | Age: 30
End: 2025-01-02
Payer: COMMERCIAL

## 2025-01-02 VITALS
WEIGHT: 273.59 LBS | HEART RATE: 71 BPM | DIASTOLIC BLOOD PRESSURE: 81 MMHG | SYSTOLIC BLOOD PRESSURE: 151 MMHG | OXYGEN SATURATION: 100 % | TEMPERATURE: 98.2 F | RESPIRATION RATE: 16 BRPM | HEIGHT: 70 IN | BODY MASS INDEX: 39.17 KG/M2

## 2025-01-02 DIAGNOSIS — R07.9 ACUTE CHEST PAIN: Primary | ICD-10-CM

## 2025-01-02 DIAGNOSIS — E66.01 MORBID OBESITY: ICD-10-CM

## 2025-01-02 DIAGNOSIS — R03.0 ELEVATED BLOOD PRESSURE READING: ICD-10-CM

## 2025-01-02 LAB
ALBUMIN SERPL-MCNC: 3.5 G/DL (ref 3.5–5)
ALBUMIN/GLOB SERPL: 1.1 (ref 1.1–2.2)
ALP SERPL-CCNC: 77 U/L (ref 45–117)
ALT SERPL-CCNC: 28 U/L (ref 12–78)
ANION GAP SERPL CALC-SCNC: 4 MMOL/L (ref 2–12)
AST SERPL-CCNC: 15 U/L (ref 15–37)
BASOPHILS # BLD: 0 K/UL (ref 0–0.1)
BASOPHILS NFR BLD: 0 % (ref 0–1)
BILIRUB SERPL-MCNC: 0.3 MG/DL (ref 0.2–1)
BUN SERPL-MCNC: 11 MG/DL (ref 6–20)
BUN/CREAT SERPL: 13 (ref 12–20)
CALCIUM SERPL-MCNC: 9 MG/DL (ref 8.5–10.1)
CHLORIDE SERPL-SCNC: 112 MMOL/L (ref 97–108)
CO2 SERPL-SCNC: 26 MMOL/L (ref 21–32)
CREAT SERPL-MCNC: 0.82 MG/DL (ref 0.7–1.3)
DIFFERENTIAL METHOD BLD: NORMAL
EKG ATRIAL RATE: 64 BPM
EKG DIAGNOSIS: NORMAL
EKG P AXIS: 69 DEGREES
EKG P-R INTERVAL: 146 MS
EKG Q-T INTERVAL: 384 MS
EKG QRS DURATION: 98 MS
EKG QTC CALCULATION (BAZETT): 396 MS
EKG R AXIS: 92 DEGREES
EKG T AXIS: 69 DEGREES
EKG VENTRICULAR RATE: 64 BPM
EOSINOPHIL # BLD: 0.1 K/UL (ref 0–0.4)
EOSINOPHIL NFR BLD: 2 % (ref 0–7)
ERYTHROCYTE [DISTWIDTH] IN BLOOD BY AUTOMATED COUNT: 12.9 % (ref 11.5–14.5)
GLOBULIN SER CALC-MCNC: 3.2 G/DL (ref 2–4)
GLUCOSE SERPL-MCNC: 78 MG/DL (ref 65–100)
HCT VFR BLD AUTO: 41.4 % (ref 36.6–50.3)
HGB BLD-MCNC: 13.3 G/DL (ref 12.1–17)
IMM GRANULOCYTES # BLD AUTO: 0 K/UL (ref 0–0.04)
IMM GRANULOCYTES NFR BLD AUTO: 0 % (ref 0–0.5)
LYMPHOCYTES # BLD: 3 K/UL (ref 0.8–3.5)
LYMPHOCYTES NFR BLD: 40 % (ref 12–49)
MCH RBC QN AUTO: 27.4 PG (ref 26–34)
MCHC RBC AUTO-ENTMCNC: 32.1 G/DL (ref 30–36.5)
MCV RBC AUTO: 85.4 FL (ref 80–99)
MONOCYTES # BLD: 0.5 K/UL (ref 0–1)
MONOCYTES NFR BLD: 7 % (ref 5–13)
NEUTS SEG # BLD: 3.8 K/UL (ref 1.8–8)
NEUTS SEG NFR BLD: 51 % (ref 32–75)
NRBC # BLD: 0 K/UL (ref 0–0.01)
NRBC BLD-RTO: 0 PER 100 WBC
PLATELET # BLD AUTO: 183 K/UL (ref 150–400)
PMV BLD AUTO: 10.3 FL (ref 8.9–12.9)
POTASSIUM SERPL-SCNC: 3.7 MMOL/L (ref 3.5–5.1)
PROT SERPL-MCNC: 6.7 G/DL (ref 6.4–8.2)
RBC # BLD AUTO: 4.85 M/UL (ref 4.1–5.7)
SODIUM SERPL-SCNC: 142 MMOL/L (ref 136–145)
TROPONIN I SERPL HS-MCNC: 5 NG/L (ref 0–76)
TROPONIN I SERPL HS-MCNC: 5 NG/L (ref 0–76)
WBC # BLD AUTO: 7.4 K/UL (ref 4.1–11.1)

## 2025-01-02 PROCEDURE — 6370000000 HC RX 637 (ALT 250 FOR IP): Performed by: PHYSICIAN ASSISTANT

## 2025-01-02 PROCEDURE — 80053 COMPREHEN METABOLIC PANEL: CPT

## 2025-01-02 PROCEDURE — 99285 EMERGENCY DEPT VISIT HI MDM: CPT

## 2025-01-02 PROCEDURE — 36415 COLL VENOUS BLD VENIPUNCTURE: CPT

## 2025-01-02 PROCEDURE — 71045 X-RAY EXAM CHEST 1 VIEW: CPT

## 2025-01-02 PROCEDURE — 85025 COMPLETE CBC W/AUTO DIFF WBC: CPT

## 2025-01-02 PROCEDURE — 84484 ASSAY OF TROPONIN QUANT: CPT

## 2025-01-02 RX ORDER — IBUPROFEN 600 MG/1
600 TABLET, FILM COATED ORAL EVERY 8 HOURS PRN
Qty: 20 TABLET | Refills: 0 | Status: SHIPPED | OUTPATIENT
Start: 2025-01-02

## 2025-01-02 RX ADMIN — LIDOCAINE HYDROCHLORIDE 40 ML: 20 SOLUTION ORAL at 08:41

## 2025-01-02 ASSESSMENT — PAIN DESCRIPTION - DESCRIPTORS: DESCRIPTORS: ACHING

## 2025-01-02 ASSESSMENT — PAIN DESCRIPTION - ORIENTATION: ORIENTATION: LEFT

## 2025-01-02 ASSESSMENT — PAIN - FUNCTIONAL ASSESSMENT: PAIN_FUNCTIONAL_ASSESSMENT: 0-10

## 2025-01-02 ASSESSMENT — PAIN SCALES - GENERAL: PAINLEVEL_OUTOF10: 4

## 2025-01-02 ASSESSMENT — ENCOUNTER SYMPTOMS
NAUSEA: 0
COUGH: 0
SHORTNESS OF BREATH: 0
VOMITING: 0
ABDOMINAL PAIN: 0

## 2025-01-02 ASSESSMENT — HEART SCORE: ECG: NORMAL

## 2025-01-02 ASSESSMENT — PAIN DESCRIPTION - LOCATION: LOCATION: CHEST

## 2025-01-02 NOTE — DISCHARGE INSTRUCTIONS
Thank You!    It was a pleasure taking care of you in our Emergency Department today. We know that when you come to our Emergency Department, you are entrusting us with your health, comfort, and safety. Our physicians and nurses honor that trust, and truly appreciate the opportunity to care for you and your loved ones.      We also value your feedback. If you receive a survey about your Emergency Department experience today, please fill it out.  We care about our patients' feedback, and we listen to what you have to say.  Thank you.    Wili Epps PA-C      ________________________________________________________________________  I have included a copy of your lab results and/or radiologic studies from today's visit so you can have them easily available at your follow-up visit. We hope you feel better and please do not hesitate to contact the ED if you have any questions at all!    Recent Results (from the past 12 hour(s))   CBC with Auto Differential    Collection Time: 01/02/25  8:15 AM   Result Value Ref Range    WBC 7.4 4.1 - 11.1 K/uL    RBC 4.85 4.10 - 5.70 M/uL    Hemoglobin 13.3 12.1 - 17.0 g/dL    Hematocrit 41.4 36.6 - 50.3 %    MCV 85.4 80.0 - 99.0 FL    MCH 27.4 26.0 - 34.0 PG    MCHC 32.1 30.0 - 36.5 g/dL    RDW 12.9 11.5 - 14.5 %    Platelets 183 150 - 400 K/uL    MPV 10.3 8.9 - 12.9 FL    Nucleated RBCs 0.0 0  WBC    nRBC 0.00 0.00 - 0.01 K/uL    Neutrophils % 51 32 - 75 %    Lymphocytes % 40 12 - 49 %    Monocytes % 7 5 - 13 %    Eosinophils % 2 0 - 7 %    Basophils % 0 0 - 1 %    Immature Granulocytes % 0 0.0 - 0.5 %    Neutrophils Absolute 3.8 1.8 - 8.0 K/UL    Lymphocytes Absolute 3.0 0.8 - 3.5 K/UL    Monocytes Absolute 0.5 0.0 - 1.0 K/UL    Eosinophils Absolute 0.1 0.0 - 0.4 K/UL    Basophils Absolute 0.0 0.0 - 0.1 K/UL    Immature Granulocytes Absolute 0.0 0.00 - 0.04 K/UL    Differential Type AUTOMATED     Troponin    Collection Time: 01/02/25  8:15 AM   Result Value Ref Range

## 2025-01-02 NOTE — ED PROVIDER NOTES
[facility] EMERGENCY DEPT  EMERGENCY DEPARTMENT ENCOUNTER       Pt Name: Phillip Maloney  MRN: [id number]  Birthdate 1995  Date of evaluation: 1/2/2025  Provider: LORENA Silver   PCP: Ivelisse Myers MD  Note Started: 7:57 AM EST 1/2/25     CHIEF COMPLAINT       Chief Complaint   Patient presents with    Chest Pain     Patient ambulatory into triage complaining of L-sided chest pain and pressure x1.5 hours. Patient denies any nausea or vomiting today but reports nausea yesterday. Hx GERD.        HISTORY OF PRESENT ILLNESS: 1 or more elements      History From: Patient  HPI Limitations: None     Phillip Maloney is a 29 y.o. male who presents by POV with an hour or so of mild left-sided chest pain.  Symptoms started at rest while driving and do not seem to be provoked with movement or palpation.  He denies any shortness of breath.  He tells me he lives in [place] and was driving from the [facility] to [place] for work.  He tells me long term through his trip he started to experience his symptoms.  He tells me he has a history of \"GERD\" however his symptoms felt different.  Pain is well localized and does not radiate.  Denies any nausea or vomiting.  He has been well lately without fever.  Denies any cough.  Denies any history of asthma.  He convincingly denies any illicit drug use, alcohol or tobacco use.  He has no personal cardiac history and describes no known family history of cardiac health issues.  He tells me he takes Cymbalta and venlafaxine as mood stabilizers and Zyrtec for congestion.     Nursing Notes were all reviewed and agreed with or any disagreements were addressed in the HPI.     REVIEW OF SYSTEMS      Review of Systems   Constitutional:  Negative for fever.   Respiratory:  Negative for cough and shortness of breath.    Cardiovascular:  Positive for chest pain.   Gastrointestinal:  Negative for abdominal pain, nausea and vomiting.        Positives and Pertinent negatives as per  obesity          DISPOSITION/PLAN   DISPOSITION Decision To Discharge 01/02/2025 09:58:26 AM   DISPOSITION CONDITION Stable         Discharge Note: The patient is stable for discharge home. The signs, symptoms, diagnosis, and discharge instructions have been discussed, understanding conveyed, and agreed upon. The patient is to follow up as recommended or return to ER should their symptoms worsen.      PATIENT REFERRED TO:  Ivelisse Myers MD  [id number]  [facility] [id number] [phone]    Call   PRIMARY CARE: call today to schedule follow up regarding your ER visit and high blood pressure       DISCHARGE MEDICATIONS:     Medication List        START taking these medications      ibuprofen 600 MG tablet  Commonly known as: ADVIL;MOTRIN  Take 1 tablet by mouth every 8 hours as needed for Pain            ASK your doctor about these medications      acetaminophen 500 MG tablet  Commonly known as: TYLENOL  Take 1 tablet by mouth 4 times daily as needed for Pain     DULoxetine 30 MG extended release capsule  Commonly known as: CYMBALTA     ZyrTEC Allergy 10 MG Caps  Generic drug: Cetirizine HCl               Where to Get Your Medications        These medications were sent to [facility] [id number] [place], [place] - [address] [place] [address] - P [phone] - F [phone]  [address] [place] [address]      Phone: [phone]   ibuprofen 600 MG tablet           DISCONTINUED MEDICATIONS:  Discharge Medication List as of 1/2/2025 10:01 AM        I have seen and evaluated the patient autonomously. My supervision physician was on site and available for consultation if needed.     I am the Primary Clinician of Record.   LORENA Silver (electronically signed)    (Please note that parts of this dictation were completed with voice recognition software. Quite often unanticipated grammatical, syntax, homophones, and other interpretive errors are inadvertently transcribed by the computer software.

## 2025-01-02 NOTE — ED NOTES
Pt's IV removed. Pt provided D/C instructions including but not limited to follow-up care, medications, how to access MyChart and verbalizes understanding of D/C teaching. Pt has all belongings. At this time, pt leaving ambulatory w/ self out of ED to personal/hospital transport at this time. Patient is A&Ox4, on RA, and is in NAD at the time of discharge.

## 2025-01-06 LAB
EKG ATRIAL RATE: 64 BPM
EKG DIAGNOSIS: NORMAL
EKG P AXIS: 69 DEGREES
EKG P-R INTERVAL: 146 MS
EKG Q-T INTERVAL: 384 MS
EKG QRS DURATION: 98 MS
EKG QTC CALCULATION (BAZETT): 396 MS
EKG R AXIS: 92 DEGREES
EKG T AXIS: 69 DEGREES
EKG VENTRICULAR RATE: 64 BPM

## 2025-01-22 ENCOUNTER — HOSPITAL ENCOUNTER (EMERGENCY)
Facility: HOSPITAL | Age: 30
Discharge: HOME | End: 2025-01-22
Payer: COMMERCIAL

## 2025-01-22 ENCOUNTER — APPOINTMENT (OUTPATIENT)
Facility: HOSPITAL | Age: 30
End: 2025-01-22
Payer: COMMERCIAL

## 2025-01-22 VITALS
SYSTOLIC BLOOD PRESSURE: 120 MMHG | TEMPERATURE: 98.1 F | BODY MASS INDEX: 38.41 KG/M2 | DIASTOLIC BLOOD PRESSURE: 84 MMHG | RESPIRATION RATE: 21 BRPM | WEIGHT: 268.3 LBS | HEART RATE: 62 BPM | HEIGHT: 70 IN | OXYGEN SATURATION: 99 %

## 2025-01-22 DIAGNOSIS — N20.0 KIDNEY STONE: ICD-10-CM

## 2025-01-22 DIAGNOSIS — R07.9 CHEST PAIN, UNSPECIFIED TYPE: Primary | ICD-10-CM

## 2025-01-22 DIAGNOSIS — R74.8 ELEVATED LIPASE: ICD-10-CM

## 2025-01-22 LAB
ALBUMIN SERPL-MCNC: 4.1 G/DL (ref 3.5–5)
ALBUMIN/GLOB SERPL: 1.3 (ref 1.1–2.2)
ALP SERPL-CCNC: 84 U/L (ref 45–117)
ALT SERPL-CCNC: 40 U/L (ref 12–78)
ANION GAP SERPL CALC-SCNC: 5 MMOL/L (ref 2–12)
AST SERPL-CCNC: 22 U/L (ref 15–37)
BASOPHILS # BLD: 0.04 K/UL (ref 0–0.1)
BASOPHILS NFR BLD: 0.4 % (ref 0–1)
BILIRUB SERPL-MCNC: 0.5 MG/DL (ref 0.2–1)
BUN SERPL-MCNC: 9 MG/DL (ref 6–20)
BUN/CREAT SERPL: 10 (ref 12–20)
CALCIUM SERPL-MCNC: 9.6 MG/DL (ref 8.5–10.1)
CHLORIDE SERPL-SCNC: 109 MMOL/L (ref 97–108)
CO2 SERPL-SCNC: 27 MMOL/L (ref 21–32)
COMMENT:: NORMAL
CREAT SERPL-MCNC: 0.86 MG/DL (ref 0.7–1.3)
D DIMER PPP FEU-MCNC: 0.3 MG/L FEU (ref 0–0.65)
DIFFERENTIAL METHOD BLD: NORMAL
EOSINOPHIL # BLD: 0.07 K/UL (ref 0–0.4)
EOSINOPHIL NFR BLD: 0.7 % (ref 0–7)
ERYTHROCYTE [DISTWIDTH] IN BLOOD BY AUTOMATED COUNT: 12.8 % (ref 11.5–14.5)
GLOBULIN SER CALC-MCNC: 3.2 G/DL (ref 2–4)
GLUCOSE SERPL-MCNC: 87 MG/DL (ref 65–100)
HCT VFR BLD AUTO: 40.9 % (ref 36.6–50.3)
HGB BLD-MCNC: 13.4 G/DL (ref 12.1–17)
IMM GRANULOCYTES # BLD AUTO: 0.02 K/UL (ref 0–0.04)
IMM GRANULOCYTES NFR BLD AUTO: 0.2 % (ref 0–0.5)
LIPASE SERPL-CCNC: 216 U/L (ref 13–75)
LYMPHOCYTES # BLD: 3.19 K/UL (ref 0.8–3.5)
LYMPHOCYTES NFR BLD: 32 % (ref 12–49)
MCH RBC QN AUTO: 27.9 PG (ref 26–34)
MCHC RBC AUTO-ENTMCNC: 32.8 G/DL (ref 30–36.5)
MCV RBC AUTO: 85 FL (ref 80–99)
MONOCYTES # BLD: 0.55 K/UL (ref 0–1)
MONOCYTES NFR BLD: 5.5 % (ref 5–13)
NEUTS SEG # BLD: 6.1 K/UL (ref 1.8–8)
NEUTS SEG NFR BLD: 61.2 % (ref 32–75)
NRBC # BLD: 0 K/UL (ref 0–0.01)
NRBC BLD-RTO: 0 PER 100 WBC
PLATELET # BLD AUTO: 234 K/UL (ref 150–400)
PMV BLD AUTO: 10.8 FL (ref 8.9–12.9)
POTASSIUM SERPL-SCNC: 4.1 MMOL/L (ref 3.5–5.1)
PROT SERPL-MCNC: 7.3 G/DL (ref 6.4–8.2)
RBC # BLD AUTO: 4.81 M/UL (ref 4.1–5.7)
SODIUM SERPL-SCNC: 141 MMOL/L (ref 136–145)
SPECIMEN HOLD: NORMAL
TROPONIN I SERPL HS-MCNC: 4 NG/L (ref 0–76)
TROPONIN I SERPL HS-MCNC: <4 NG/L (ref 0–76)
WBC # BLD AUTO: 10 K/UL (ref 4.1–11.1)

## 2025-01-22 PROCEDURE — 85025 COMPLETE CBC W/AUTO DIFF WBC: CPT

## 2025-01-22 PROCEDURE — 84484 ASSAY OF TROPONIN QUANT: CPT

## 2025-01-22 PROCEDURE — 6360000004 HC RX CONTRAST MEDICATION: Performed by: PHYSICIAN ASSISTANT

## 2025-01-22 PROCEDURE — 99285 EMERGENCY DEPT VISIT HI MDM: CPT

## 2025-01-22 PROCEDURE — 74177 CT ABD & PELVIS W/CONTRAST: CPT

## 2025-01-22 PROCEDURE — 83690 ASSAY OF LIPASE: CPT

## 2025-01-22 PROCEDURE — 71046 X-RAY EXAM CHEST 2 VIEWS: CPT

## 2025-01-22 PROCEDURE — 93005 ELECTROCARDIOGRAM TRACING: CPT

## 2025-01-22 PROCEDURE — 80053 COMPREHEN METABOLIC PANEL: CPT

## 2025-01-22 PROCEDURE — 36415 COLL VENOUS BLD VENIPUNCTURE: CPT

## 2025-01-22 PROCEDURE — 85379 FIBRIN DEGRADATION QUANT: CPT

## 2025-01-22 RX ORDER — IOPAMIDOL 755 MG/ML
100 INJECTION, SOLUTION INTRAVASCULAR
Status: COMPLETED | OUTPATIENT
Start: 2025-01-22 | End: 2025-01-22

## 2025-01-22 RX ADMIN — IOPAMIDOL 100 ML: 755 INJECTION, SOLUTION INTRAVENOUS at 17:14

## 2025-01-22 ASSESSMENT — PAIN SCALES - GENERAL: PAINLEVEL_OUTOF10: 0

## 2025-01-22 NOTE — DISCHARGE INSTRUCTIONS
As we discussed your lipase is elevated here today and this needs to be trended with your primary care.  Please follow-up with urology as well as cardiology.  Worsening symptoms return to the emergency department

## 2025-01-22 NOTE — ED NOTES
This RN has reviewed discharge instructions with the patient at this time. Patient has been made aware of prescription(s) called into pharmacy on file for , follow up care, and diagnoses care instructions. The Patient and Family member verbalized understanding and denies any further questions. VSS and pt AOx4. Patient ambulatory out to waiting room at this time.

## 2025-01-22 NOTE — ED PROVIDER NOTES
lb 4.8 oz)    Height: 1.778 m (5' 10\")         Patient was given the following medications:  Medications   iopamidol (ISOVUE-370) 76 % injection 100 mL (100 mLs IntraVENous Given 1/22/25 1714)       CONSULTS: (Who and What was discussed)  None    Chronic Conditions: See above    Social Determinants affecting Dx or Tx: None    Records Reviewed (source and summary of external records): Nursing Notes, Old Medical Records, and Previous Laboratory Studies    MDM: CC/HPI Summary, DDx, ED Course, and Reassessment. Disposition Considerations (Tests not done, Shared Decision Making, Pt Expectation of Test or Tx.):   Patient is a 30-year-old male presenting to the emergency department because of chest discomfort.  He is a low risk heart score of 2.  Afebrile.  Heart rate within normal.  O2 sat 100%.  He has had 2 negative troponin.  Chest x-ray no acute process.  He has a negative dimer.  He had a mildly elevated lipase.  Therefore CT scan obtained.  No brief of pancreatitis.  However it was noted that he had left-sided hydronephrosis with a 14 x 16 mm calculus in the left renal pelvis.  He has no pain in his back and no dysuria.  States he has known stones that he is post to be following up in regards to.  At this point no feel any further workup is indicated.  Will recommend following up with cardiology.  Urology.  Primary care for trending the lipase.  Worsening of symptoms patient is to return to the emergency department    ED Course as of 01/23/25 1849   Wed Jan 22, 2025   1751 EKG is performed at 14: 38, independently interpreted by myself as sinus rhythm at a rate of 74, no ST segment elevation or depression concerning for ACS [CV]      ED Course User Index  [CV] Aurelia Ling MD         FINAL IMPRESSION     1. Chest pain, unspecified type    2. Elevated lipase    3. Kidney stone          DISPOSITION/PLAN   DISPOSITION Decision To Discharge 01/22/2025 05:46:23 PM   DISPOSITION CONDITION Stable           Discharge

## 2025-01-23 LAB
EKG ATRIAL RATE: 74 BPM
EKG DIAGNOSIS: NORMAL
EKG P AXIS: 53 DEGREES
EKG P-R INTERVAL: 142 MS
EKG Q-T INTERVAL: 364 MS
EKG QRS DURATION: 94 MS
EKG QTC CALCULATION (BAZETT): 404 MS
EKG R AXIS: 86 DEGREES
EKG T AXIS: 29 DEGREES
EKG VENTRICULAR RATE: 74 BPM